# Patient Record
Sex: FEMALE | Race: WHITE | NOT HISPANIC OR LATINO | Employment: FULL TIME | URBAN - NONMETROPOLITAN AREA
[De-identification: names, ages, dates, MRNs, and addresses within clinical notes are randomized per-mention and may not be internally consistent; named-entity substitution may affect disease eponyms.]

---

## 2019-08-20 ENCOUNTER — OFFICE VISIT (OUTPATIENT)
Dept: ORTHOPEDIC SURGERY | Facility: CLINIC | Age: 59
End: 2019-08-20

## 2019-08-20 ENCOUNTER — HOSPITAL ENCOUNTER (OUTPATIENT)
Dept: GENERAL RADIOLOGY | Facility: HOSPITAL | Age: 59
Discharge: HOME | End: 2019-08-20
Admitting: ORTHOPAEDIC SURGERY

## 2019-08-20 VITALS
DIASTOLIC BLOOD PRESSURE: 94 MMHG | HEIGHT: 66 IN | WEIGHT: 135 LBS | HEART RATE: 74 BPM | SYSTOLIC BLOOD PRESSURE: 141 MMHG | BODY MASS INDEX: 21.69 KG/M2

## 2019-08-20 DIAGNOSIS — M25.521 RIGHT ELBOW PAIN: Primary | ICD-10-CM

## 2019-08-20 DIAGNOSIS — S52.134A CLOSED NONDISPLACED FRACTURE OF NECK OF RIGHT RADIUS, INITIAL ENCOUNTER: Primary | ICD-10-CM

## 2019-08-20 DIAGNOSIS — M25.521 RIGHT ELBOW PAIN: ICD-10-CM

## 2019-08-20 PROCEDURE — 73080 X-RAY EXAM OF ELBOW: CPT | Performed by: RADIOLOGY

## 2019-08-20 PROCEDURE — 24650 CLTX RDL HEAD/NCK FX WO MNPJ: CPT | Performed by: ORTHOPAEDIC SURGERY

## 2019-08-20 PROCEDURE — 73080 X-RAY EXAM OF ELBOW: CPT

## 2019-08-20 RX ORDER — LEVOTHYROXINE SODIUM 88 UG/1
88 TABLET ORAL DAILY
COMMUNITY
Start: 2019-08-12 | End: 2021-01-27 | Stop reason: DRUGHIGH

## 2019-08-20 RX ORDER — IBUPROFEN 800 MG/1
800 TABLET ORAL EVERY 8 HOURS PRN
Qty: 30 TABLET | Refills: 1 | Status: SHIPPED | OUTPATIENT
Start: 2019-08-20

## 2019-08-20 NOTE — PROGRESS NOTES
"Patient: Hina Marcum    YOB: 1960    Chief Complaint   Patient presents with   • Right Elbow - Pain, Edema         History of Present Illness: 58-year-old white female who presents for evaluation of her right elbow.  Apparently she actually fell down the stairs couple days ago landing on her arm.  Complains of pain around her elbow especially with movement.  No specific complaints paresthesias    Past Medical History:   Diagnosis Date   • Thyroid disease         Social History     Socioeconomic History   • Marital status:      Spouse name: Not on file   • Number of children: Not on file   • Years of education: Not on file   • Highest education level: Not on file   Tobacco Use   • Smoking status: Former Smoker     Types: Cigarettes     Last attempt to quit: 1996     Years since quittin.0   • Smokeless tobacco: Never Used   Substance and Sexual Activity   • Alcohol use: Yes     Comment: occasionally    • Drug use: No   • Sexual activity: Yes     Partners: Male           Physical Exam: 58 y.o. female  General Appearance:    Alert and oriented x 3, cooperative, in no acute distress                   Vitals:    19 1051   BP: 141/94   Pulse: 74   Weight: 61.2 kg (135 lb)   Height: 167.6 cm (66\")          Right hand grossly neurovascular intact without swelling.  There is pain with any motion of the elbow pronation supination.  The skin is intact no obvious deformity      Radiology:       X-rays taken today and reviewed myself show that she has a nondisplaced short oblique radial neck fracture.      Assessment/Plan: Right elbow radial neck fracture.  Patient was placed in a well molded synthetic long-arm cast.  We will see her back in approximately 3 to 4 weeks for follow-up x-ray and check.  She will be off work for period of time.  Elevate and ice as necessary.  Did give her a prescription for ibuprofen 800 mg every 8 hours.  So note for off work                  Patient's " Body mass index is 21.79 kg/m². BMI is within normal parameters. No follow-up required..      Discussion/Summary:                This chart was completed utilizing the dragon speech recognition software.  Grammatical errors, random word insertions, pronoun errors, and incomplete sentences or occasional consequences of the system due to software limitations, ambient noise, and hardware issues.  Any questions or concerns about the content, text, or information contained within the body of this dictation should be directly addressed to the physician for clarification        This document was signed by Jhony Lei M.D. August 20, 2019 11:05 AM

## 2019-09-10 ENCOUNTER — HOSPITAL ENCOUNTER (OUTPATIENT)
Dept: GENERAL RADIOLOGY | Facility: HOSPITAL | Age: 59
Discharge: HOME | End: 2019-09-10
Admitting: ORTHOPAEDIC SURGERY

## 2019-09-10 ENCOUNTER — OFFICE VISIT (OUTPATIENT)
Dept: ORTHOPEDIC SURGERY | Facility: CLINIC | Age: 59
End: 2019-09-10

## 2019-09-10 VITALS — HEIGHT: 66 IN | WEIGHT: 134.92 LBS | BODY MASS INDEX: 21.68 KG/M2

## 2019-09-10 DIAGNOSIS — S52.134A CLOSED NONDISPLACED FRACTURE OF NECK OF RIGHT RADIUS, INITIAL ENCOUNTER: ICD-10-CM

## 2019-09-10 DIAGNOSIS — S52.134A CLOSED NONDISPLACED FRACTURE OF NECK OF RIGHT RADIUS, INITIAL ENCOUNTER: Primary | ICD-10-CM

## 2019-09-10 PROCEDURE — 99024 POSTOP FOLLOW-UP VISIT: CPT | Performed by: ORTHOPAEDIC SURGERY

## 2019-09-10 PROCEDURE — 73080 X-RAY EXAM OF ELBOW: CPT | Performed by: RADIOLOGY

## 2019-09-10 PROCEDURE — 73080 X-RAY EXAM OF ELBOW: CPT

## 2019-09-10 NOTE — PROGRESS NOTES
"Patient: Hina Marcum    YOB: 1960    Chief Complaint   Patient presents with   • Right Elbow - Follow-up, Fracture         History of Present Illness: Presents today for x-rays out of cast.  Slightly impacted nondisplaced radial head fracture.  We have placed her in a long-arm cast for 3 weeks.  She presents today without major complaints.  States it feels better getting out of the cast feels a lot better than prior to casting.  She complains of stiffness and feels like her wrist is jammed.  No specific paresthesias    Past Medical History:   Diagnosis Date   • Thyroid disease         Social History     Socioeconomic History   • Marital status:      Spouse name: Not on file   • Number of children: Not on file   • Years of education: Not on file   • Highest education level: Not on file   Tobacco Use   • Smoking status: Former Smoker     Types: Cigarettes     Last attempt to quit: 1996     Years since quittin.0   • Smokeless tobacco: Never Used   Substance and Sexual Activity   • Alcohol use: Yes     Comment: occasionally    • Drug use: No   • Sexual activity: Yes     Partners: Male           Physical Exam: 58 y.o. female  General Appearance:    Alert and oriented x 3, cooperative, in no acute distress                   Vitals:    09/10/19 1041   Weight: 61.2 kg (134 lb 14.7 oz)   Height: 167.6 cm (65.98\")          Is intact.  There is no obvious deformity or swelling.  She is lacking maybe about 20 degrees of extension and 20 degrees of flexion but she actually moving her elbow pretty well she is lacking maybe about 20 degrees of pronation and supination but she actually can reasonably pronate and supinate her arm well.  Right hand grossly neurovascular intact without swelling      Radiology:       X-ray shows no change in alignment of the fracture.  Reviewed myself      Assessment/Plan: Healing right radial head fracture.  Plan at this time is to put her in a sling.  She can " begin gentle range of motion as comfort permits of her right elbow.  She can use it for activities of daily living such as brushing her teeth eating doing her hair.  I do not want her to lift anything heavier than a cup of coffee however.  Do not follow this for no use for heavy or repetitive work.  We will see her back in 4 weeks for follow-up x-ray and check            Discussion/Summary:                This chart was completed utilizing the dragon speech recognition software.  Grammatical errors, random word insertions, pronoun errors, and incomplete sentences or occasional consequences of the system due to software limitations, ambient noise, and hardware issues.  Any questions or concerns about the content, text, or information contained within the body of this dictation should be directly addressed to the physician for clarification        This document was signed by Jhony Lei M.D. September 10, 2019 11:51 AM

## 2019-10-08 ENCOUNTER — HOSPITAL ENCOUNTER (OUTPATIENT)
Dept: GENERAL RADIOLOGY | Facility: HOSPITAL | Age: 59
Discharge: HOME | End: 2019-10-08
Admitting: ORTHOPAEDIC SURGERY

## 2019-10-08 ENCOUNTER — OFFICE VISIT (OUTPATIENT)
Dept: ORTHOPEDIC SURGERY | Facility: CLINIC | Age: 59
End: 2019-10-08

## 2019-10-08 VITALS — HEIGHT: 66 IN | WEIGHT: 134 LBS | BODY MASS INDEX: 21.53 KG/M2

## 2019-10-08 DIAGNOSIS — S52.134A CLOSED NONDISPLACED FRACTURE OF NECK OF RIGHT RADIUS, INITIAL ENCOUNTER: Primary | ICD-10-CM

## 2019-10-08 DIAGNOSIS — S52.134D CLOSED NONDISPLACED FRACTURE OF NECK OF RIGHT RADIUS WITH ROUTINE HEALING, SUBSEQUENT ENCOUNTER: Primary | ICD-10-CM

## 2019-10-08 DIAGNOSIS — S52.134A CLOSED NONDISPLACED FRACTURE OF NECK OF RIGHT RADIUS, INITIAL ENCOUNTER: ICD-10-CM

## 2019-10-08 PROCEDURE — 73080 X-RAY EXAM OF ELBOW: CPT | Performed by: RADIOLOGY

## 2019-10-08 PROCEDURE — 99024 POSTOP FOLLOW-UP VISIT: CPT | Performed by: ORTHOPAEDIC SURGERY

## 2019-10-08 PROCEDURE — 73080 X-RAY EXAM OF ELBOW: CPT

## 2019-10-08 NOTE — PROGRESS NOTES
"Patient: Hina Marcum    YOB: 1960    Chief Complaint   Patient presents with   • Right Elbow - Follow-up, Fracture         History of Present Illness: Patient presents for follow-up of her right radial head fracture.  She is doing very well without any significant complaints.  No paresthesias looking to get back to work    Past Medical History:   Diagnosis Date   • Thyroid disease         Social History     Socioeconomic History   • Marital status:      Spouse name: Not on file   • Number of children: Not on file   • Years of education: Not on file   • Highest education level: Not on file   Tobacco Use   • Smoking status: Former Smoker     Types: Cigarettes     Last attempt to quit: 1996     Years since quittin.1   • Smokeless tobacco: Never Used   Substance and Sexual Activity   • Alcohol use: Yes     Comment: occasionally    • Drug use: No   • Sexual activity: Yes     Partners: Male           Physical Exam: 58 y.o. female  General Appearance:    Alert and oriented x 3, cooperative, in no acute distress                   Vitals:    10/08/19 1458   Weight: 60.8 kg (134 lb)   Height: 167.6 cm (66\")          Exam shows she has almost full extension may be lacking couple degrees has good flexion full pronation supination      Radiology:       X-ray shows no significant change in alignment of the fracture with callus noted these were reviewed by myself      Assessment/Plan: Healing radial head fracture.  Plan is to go back to work next Monday.  Be careful of falling on this or any extreme activity with this.  Otherwise pretty much activities of daily living as tolerates.  We will see her back in 4 weeks for final x-ray and check                      Discussion/Summary:                This chart was completed utilizing the dragon speech recognition software.  Grammatical errors, random word insertions, pronoun errors, and incomplete sentences or occasional consequences of the system " due to software limitations, ambient noise, and hardware issues.  Any questions or concerns about the content, text, or information contained within the body of this dictation should be directly addressed to the physician for clarification        This document was signed by Jhony eLi M.D. October 8, 2019 3:45 PM

## 2019-11-08 ENCOUNTER — APPOINTMENT (OUTPATIENT)
Dept: GENERAL RADIOLOGY | Facility: HOSPITAL | Age: 59
End: 2019-11-08

## 2020-01-30 ENCOUNTER — TRANSCRIBE ORDERS (OUTPATIENT)
Dept: ADMINISTRATIVE | Facility: HOSPITAL | Age: 60
End: 2020-01-30

## 2020-01-30 DIAGNOSIS — R07.89 CHEST DISCOMFORT: Primary | ICD-10-CM

## 2020-02-03 ENCOUNTER — HOSPITAL ENCOUNTER (OUTPATIENT)
Dept: CARDIOLOGY | Facility: HOSPITAL | Age: 60
Discharge: HOME | End: 2020-02-03
Admitting: NURSE PRACTITIONER

## 2020-02-03 DIAGNOSIS — R07.89 CHEST DISCOMFORT: ICD-10-CM

## 2020-02-03 PROCEDURE — 93017 CV STRESS TEST TRACING ONLY: CPT

## 2020-02-03 PROCEDURE — 93018 CV STRESS TEST I&R ONLY: CPT | Performed by: INTERNAL MEDICINE

## 2020-02-03 RX ORDER — OMEPRAZOLE 20 MG/1
20 CAPSULE, DELAYED RELEASE ORAL DAILY
COMMUNITY

## 2020-02-04 LAB
BH CV STRESS BP STAGE 1: NORMAL
BH CV STRESS BP STAGE 2: NORMAL
BH CV STRESS BP STAGE 3: NORMAL
BH CV STRESS BP STAGE 4: NORMAL
BH CV STRESS DURATION MIN STAGE 1: 3
BH CV STRESS DURATION MIN STAGE 2: 3
BH CV STRESS DURATION MIN STAGE 3: 3
BH CV STRESS DURATION MIN STAGE 4: 3
BH CV STRESS DURATION SEC STAGE 1: 0
BH CV STRESS DURATION SEC STAGE 2: 0
BH CV STRESS DURATION SEC STAGE 3: 0
BH CV STRESS DURATION SEC STAGE 4: 0
BH CV STRESS GRADE STAGE 1: 10
BH CV STRESS GRADE STAGE 2: 12
BH CV STRESS GRADE STAGE 3: 14
BH CV STRESS GRADE STAGE 4: 16
BH CV STRESS HR STAGE 1: 101
BH CV STRESS HR STAGE 2: 109
BH CV STRESS HR STAGE 3: 135
BH CV STRESS HR STAGE 4: 136
BH CV STRESS METS STAGE 1: 5
BH CV STRESS METS STAGE 2: 7.5
BH CV STRESS METS STAGE 3: 10
BH CV STRESS METS STAGE 4: 13.5
BH CV STRESS PROTOCOL 1: NORMAL
BH CV STRESS RECOVERY BP: NORMAL MMHG
BH CV STRESS RECOVERY HR: 79 BPM
BH CV STRESS SPEED STAGE 1: 1.7
BH CV STRESS SPEED STAGE 2: 2.5
BH CV STRESS SPEED STAGE 3: 3.4
BH CV STRESS SPEED STAGE 4: 4.2
BH CV STRESS STAGE 1: 1
BH CV STRESS STAGE 2: 2
BH CV STRESS STAGE 3: 3
BH CV STRESS STAGE 4: 4
MAXIMAL PREDICTED HEART RATE: 160 BPM
PERCENT MAX PREDICTED HR: 85 %
STRESS BASELINE BP: NORMAL MMHG
STRESS BASELINE HR: 78 BPM
STRESS PERCENT HR: 100 %
STRESS POST ESTIMATED WORKLOAD: 10.1 METS
STRESS POST EXERCISE DUR MIN: 9 MIN
STRESS POST EXERCISE DUR SEC: 15 SEC
STRESS POST PEAK BP: NORMAL MMHG
STRESS POST PEAK HR: 136 BPM
STRESS TARGET HR: 136 BPM

## 2020-02-11 ENCOUNTER — OFFICE VISIT (OUTPATIENT)
Dept: CARDIOLOGY | Facility: CLINIC | Age: 60
End: 2020-02-11

## 2020-02-11 VITALS
HEART RATE: 64 BPM | BODY MASS INDEX: 27.71 KG/M2 | OXYGEN SATURATION: 98 % | DIASTOLIC BLOOD PRESSURE: 77 MMHG | WEIGHT: 172.4 LBS | SYSTOLIC BLOOD PRESSURE: 145 MMHG | HEIGHT: 66 IN

## 2020-02-11 DIAGNOSIS — R94.39 ABNORMAL STRESS TEST: Primary | ICD-10-CM

## 2020-02-11 DIAGNOSIS — R07.89 CHEST DISCOMFORT: ICD-10-CM

## 2020-02-11 DIAGNOSIS — K21.9 GASTROESOPHAGEAL REFLUX DISEASE WITHOUT ESOPHAGITIS: ICD-10-CM

## 2020-02-11 PROCEDURE — 99214 OFFICE O/P EST MOD 30 MIN: CPT | Performed by: INTERNAL MEDICINE

## 2020-02-11 PROCEDURE — 93000 ELECTROCARDIOGRAM COMPLETE: CPT | Performed by: INTERNAL MEDICINE

## 2020-02-11 RX ORDER — OMEPRAZOLE 20 MG/1
1 CAPSULE, DELAYED RELEASE ORAL
COMMUNITY
Start: 2020-01-28 | End: 2020-05-19

## 2020-02-11 RX ORDER — FLUOXETINE HYDROCHLORIDE 20 MG/1
20 CAPSULE ORAL DAILY
COMMUNITY
Start: 2020-02-07 | End: 2021-01-27

## 2020-02-27 ENCOUNTER — HOSPITAL ENCOUNTER (OUTPATIENT)
Dept: CARDIOLOGY | Facility: HOSPITAL | Age: 60
Discharge: HOME | End: 2020-02-27

## 2020-02-27 DIAGNOSIS — R94.39 ABNORMAL STRESS TEST: ICD-10-CM

## 2020-02-27 DIAGNOSIS — R07.89 CHEST DISCOMFORT: ICD-10-CM

## 2020-02-27 DIAGNOSIS — K21.9 GASTROESOPHAGEAL REFLUX DISEASE WITHOUT ESOPHAGITIS: ICD-10-CM

## 2020-02-27 PROCEDURE — 93351 STRESS TTE COMPLETE: CPT

## 2020-02-27 PROCEDURE — 93350 STRESS TTE ONLY: CPT | Performed by: INTERNAL MEDICINE

## 2020-02-27 PROCEDURE — 93320 DOPPLER ECHO COMPLETE: CPT | Performed by: INTERNAL MEDICINE

## 2020-02-27 PROCEDURE — 93325 DOPPLER ECHO COLOR FLOW MAPG: CPT

## 2020-02-27 PROCEDURE — 93325 DOPPLER ECHO COLOR FLOW MAPG: CPT | Performed by: INTERNAL MEDICINE

## 2020-02-27 PROCEDURE — 93018 CV STRESS TEST I&R ONLY: CPT | Performed by: INTERNAL MEDICINE

## 2020-02-27 PROCEDURE — 93320 DOPPLER ECHO COMPLETE: CPT

## 2020-03-01 LAB
BH CV ECHO MEAS - % IVS THICK: 1.2 %
BH CV ECHO MEAS - % LVPW THICK: 11.5 %
BH CV ECHO MEAS - ACS: 2.2 CM
BH CV ECHO MEAS - AO MAX PG: 5.4 MMHG
BH CV ECHO MEAS - AO MEAN PG: 3 MMHG
BH CV ECHO MEAS - AO ROOT AREA (BSA CORRECTED): 1.8
BH CV ECHO MEAS - AO ROOT AREA: 9.3 CM^2
BH CV ECHO MEAS - AO ROOT DIAM: 3.5 CM
BH CV ECHO MEAS - AO V2 MAX: 116 CM/SEC
BH CV ECHO MEAS - AO V2 MEAN: 72.9 CM/SEC
BH CV ECHO MEAS - AO V2 VTI: 23.2 CM
BH CV ECHO MEAS - BSA(HAYCOCK): 1.9 M^2
BH CV ECHO MEAS - BSA: 1.9 M^2
BH CV ECHO MEAS - BZI_BMI: 27.8 KILOGRAMS/M^2
BH CV ECHO MEAS - BZI_METRIC_HEIGHT: 167.6 CM
BH CV ECHO MEAS - BZI_METRIC_WEIGHT: 78 KG
BH CV ECHO MEAS - EDV(CUBED): 74.6 ML
BH CV ECHO MEAS - EDV(MOD-SP4): 49.6 ML
BH CV ECHO MEAS - EDV(TEICH): 79 ML
BH CV ECHO MEAS - EF(CUBED): 67.3 %
BH CV ECHO MEAS - EF(MOD-SP4): 66.5 %
BH CV ECHO MEAS - EF(TEICH): 59.2 %
BH CV ECHO MEAS - ESV(CUBED): 24.4 ML
BH CV ECHO MEAS - ESV(MOD-SP4): 16.6 ML
BH CV ECHO MEAS - ESV(TEICH): 32.2 ML
BH CV ECHO MEAS - FS: 31.1 %
BH CV ECHO MEAS - IVS/LVPW: 1.1
BH CV ECHO MEAS - IVSD: 1.2 CM
BH CV ECHO MEAS - IVSS: 1.2 CM
BH CV ECHO MEAS - LA DIMENSION: 3.3 CM
BH CV ECHO MEAS - LA/AO: 0.94
BH CV ECHO MEAS - LV DIASTOLIC VOL/BSA (35-75): 26.4 ML/M^2
BH CV ECHO MEAS - LV MASS(C)D: 166.2 GRAMS
BH CV ECHO MEAS - LV MASS(C)DI: 88.6 GRAMS/M^2
BH CV ECHO MEAS - LV MASS(C)S: 105.6 GRAMS
BH CV ECHO MEAS - LV MASS(C)SI: 56.3 GRAMS/M^2
BH CV ECHO MEAS - LV SYSTOLIC VOL/BSA (12-30): 8.8 ML/M^2
BH CV ECHO MEAS - LVIDD: 4.2 CM
BH CV ECHO MEAS - LVIDS: 2.9 CM
BH CV ECHO MEAS - LVLD AP4: 6.6 CM
BH CV ECHO MEAS - LVLS AP4: 5.7 CM
BH CV ECHO MEAS - LVOT AREA (M): 3.5 CM^2
BH CV ECHO MEAS - LVOT AREA: 3.5 CM^2
BH CV ECHO MEAS - LVOT DIAM: 2.1 CM
BH CV ECHO MEAS - LVPWD: 1.1 CM
BH CV ECHO MEAS - LVPWS: 1.2 CM
BH CV ECHO MEAS - MV A MAX VEL: 69.4 CM/SEC
BH CV ECHO MEAS - MV E MAX VEL: 87.4 CM/SEC
BH CV ECHO MEAS - MV E/A: 1.3
BH CV ECHO MEAS - PA ACC TIME: 0.11 SEC
BH CV ECHO MEAS - PA PR(ACCEL): 31.3 MMHG
BH CV ECHO MEAS - RAP SYSTOLE: 10 MMHG
BH CV ECHO MEAS - RVSP: 33.8 MMHG
BH CV ECHO MEAS - SI(AO): 115.6 ML/M^2
BH CV ECHO MEAS - SI(CUBED): 26.8 ML/M^2
BH CV ECHO MEAS - SI(MOD-SP4): 17.6 ML/M^2
BH CV ECHO MEAS - SI(TEICH): 25 ML/M^2
BH CV ECHO MEAS - SV(AO): 216.9 ML
BH CV ECHO MEAS - SV(CUBED): 50.2 ML
BH CV ECHO MEAS - SV(MOD-SP4): 33 ML
BH CV ECHO MEAS - SV(TEICH): 46.8 ML
BH CV ECHO MEAS - TR MAX VEL: 244 CM/SEC
BH CV STRESS BP STAGE 1: NORMAL
BH CV STRESS BP STAGE 2: NORMAL
BH CV STRESS BP STAGE 3: NORMAL
BH CV STRESS BP STAGE 4: NORMAL
BH CV STRESS DURATION MIN STAGE 1: 3
BH CV STRESS DURATION MIN STAGE 2: 3
BH CV STRESS DURATION MIN STAGE 3: 3
BH CV STRESS DURATION MIN STAGE 4: 3
BH CV STRESS DURATION SEC STAGE 1: 0
BH CV STRESS DURATION SEC STAGE 2: 0
BH CV STRESS DURATION SEC STAGE 3: 0
BH CV STRESS DURATION SEC STAGE 4: 0
BH CV STRESS ECHO POST STRESS EJECTION FRACTION EF: 80 %
BH CV STRESS GRADE STAGE 1: 10
BH CV STRESS GRADE STAGE 2: 12
BH CV STRESS GRADE STAGE 3: 14
BH CV STRESS GRADE STAGE 4: 16
BH CV STRESS HR STAGE 1: 93
BH CV STRESS HR STAGE 2: 106
BH CV STRESS HR STAGE 3: 130
BH CV STRESS HR STAGE 4: 136
BH CV STRESS METS STAGE 1: 5
BH CV STRESS METS STAGE 2: 7.5
BH CV STRESS METS STAGE 3: 10
BH CV STRESS METS STAGE 4: 13.5
BH CV STRESS PROTOCOL 1: NORMAL
BH CV STRESS RECOVERY BP: NORMAL MMHG
BH CV STRESS RECOVERY HR: 73 BPM
BH CV STRESS SPEED STAGE 1: 1.7
BH CV STRESS SPEED STAGE 2: 2.5
BH CV STRESS SPEED STAGE 3: 3.4
BH CV STRESS SPEED STAGE 4: 4.2
BH CV STRESS STAGE 1: 1
BH CV STRESS STAGE 2: 2
BH CV STRESS STAGE 3: 3
BH CV STRESS STAGE 4: 4
MAXIMAL PREDICTED HEART RATE: 160 BPM
PERCENT MAX PREDICTED HR: 85 %
STRESS BASELINE BP: NORMAL MMHG
STRESS BASELINE HR: 58 BPM
STRESS PERCENT HR: 100 %
STRESS POST ESTIMATED WORKLOAD: 10.1 METS
STRESS POST EXERCISE DUR MIN: 9 MIN
STRESS POST EXERCISE DUR SEC: 20 SEC
STRESS POST PEAK BP: NORMAL MMHG
STRESS POST PEAK HR: 136 BPM
STRESS TARGET HR: 136 BPM

## 2020-03-03 ENCOUNTER — TELEPHONE (OUTPATIENT)
Dept: CARDIOLOGY | Facility: CLINIC | Age: 60
End: 2020-03-03

## 2020-03-03 NOTE — TELEPHONE ENCOUNTER
----- Message from Jean-Pierre Ricketts MD sent at 3/1/2020  3:27 PM EST -----  Please call patient to her stress echo was normal, she has some changes in ekg on treadmill test but likely false positive and I will explain more in detail  Thanks.

## 2020-03-03 NOTE — TELEPHONE ENCOUNTER
Called Hina to let her know that per Dr. Crowell, her stress echo was normal. She did show some changes in her EKG on the treadmill test but was likely false positive and Dr. Crowell would explain that at her follow up in May. She is aware and understands.

## 2020-03-11 ENCOUNTER — PREP FOR SURGERY (OUTPATIENT)
Dept: OTHER | Facility: HOSPITAL | Age: 60
End: 2020-03-11

## 2020-03-11 DIAGNOSIS — M20.41 HAMMERTOE OF RIGHT FOOT: Primary | ICD-10-CM

## 2020-03-11 RX ORDER — CEFAZOLIN SODIUM 2 G/50ML
2 SOLUTION INTRAVENOUS ONCE
Status: CANCELLED | OUTPATIENT
Start: 2020-03-17 | End: 2020-03-11

## 2020-03-13 ENCOUNTER — APPOINTMENT (OUTPATIENT)
Dept: PREADMISSION TESTING | Facility: HOSPITAL | Age: 60
End: 2020-03-13

## 2020-03-13 LAB
ANION GAP SERPL CALCULATED.3IONS-SCNC: 13.2 MMOL/L (ref 5–15)
BUN BLD-MCNC: 21 MG/DL (ref 8–23)
BUN/CREAT SERPL: 28.4 (ref 7–25)
CALCIUM SPEC-SCNC: 9.3 MG/DL (ref 8.6–10.5)
CHLORIDE SERPL-SCNC: 101 MMOL/L (ref 98–107)
CO2 SERPL-SCNC: 24.8 MMOL/L (ref 22–29)
CREAT BLD-MCNC: 0.74 MG/DL (ref 0.57–1)
DEPRECATED RDW RBC AUTO: 42.3 FL (ref 37–54)
ERYTHROCYTE [DISTWIDTH] IN BLOOD BY AUTOMATED COUNT: 12.7 % (ref 12.3–15.4)
GFR SERPL CREATININE-BSD FRML MDRD: 80 ML/MIN/1.73
GLUCOSE BLD-MCNC: 94 MG/DL (ref 65–99)
HCT VFR BLD AUTO: 41.6 % (ref 34–46.6)
HGB BLD-MCNC: 13.4 G/DL (ref 12–15.9)
MCH RBC QN AUTO: 29.5 PG (ref 26.6–33)
MCHC RBC AUTO-ENTMCNC: 32.2 G/DL (ref 31.5–35.7)
MCV RBC AUTO: 91.4 FL (ref 79–97)
PLATELET # BLD AUTO: 337 10*3/MM3 (ref 140–450)
PMV BLD AUTO: 9.4 FL (ref 6–12)
POTASSIUM BLD-SCNC: 3.8 MMOL/L (ref 3.5–5.2)
RBC # BLD AUTO: 4.55 10*6/MM3 (ref 3.77–5.28)
SODIUM BLD-SCNC: 139 MMOL/L (ref 136–145)
WBC NRBC COR # BLD: 7.95 10*3/MM3 (ref 3.4–10.8)

## 2020-03-13 PROCEDURE — 36415 COLL VENOUS BLD VENIPUNCTURE: CPT

## 2020-03-13 PROCEDURE — 80048 BASIC METABOLIC PNL TOTAL CA: CPT | Performed by: ANESTHESIOLOGY

## 2020-03-13 PROCEDURE — 85027 COMPLETE CBC AUTOMATED: CPT | Performed by: ANESTHESIOLOGY

## 2020-03-13 NOTE — DISCHARGE INSTRUCTIONS

## 2020-03-17 ENCOUNTER — ANESTHESIA (OUTPATIENT)
Dept: PERIOP | Facility: HOSPITAL | Age: 60
End: 2020-03-17

## 2020-03-17 ENCOUNTER — ANESTHESIA EVENT (OUTPATIENT)
Dept: PERIOP | Facility: HOSPITAL | Age: 60
End: 2020-03-17

## 2020-03-17 ENCOUNTER — APPOINTMENT (OUTPATIENT)
Dept: GENERAL RADIOLOGY | Facility: HOSPITAL | Age: 60
End: 2020-03-17

## 2020-03-17 ENCOUNTER — HOSPITAL ENCOUNTER (OUTPATIENT)
Facility: HOSPITAL | Age: 60
Setting detail: HOSPITAL OUTPATIENT SURGERY
Discharge: HOME | End: 2020-03-17
Attending: PODIATRIST | Admitting: PODIATRIST

## 2020-03-17 VITALS
OXYGEN SATURATION: 97 % | TEMPERATURE: 97.6 F | HEART RATE: 88 BPM | DIASTOLIC BLOOD PRESSURE: 90 MMHG | HEIGHT: 64 IN | RESPIRATION RATE: 16 BRPM | WEIGHT: 171.5 LBS | SYSTOLIC BLOOD PRESSURE: 156 MMHG | BODY MASS INDEX: 29.28 KG/M2

## 2020-03-17 DIAGNOSIS — M20.41 HAMMERTOE OF RIGHT FOOT: ICD-10-CM

## 2020-03-17 PROCEDURE — 76000 FLUOROSCOPY <1 HR PHYS/QHP: CPT

## 2020-03-17 PROCEDURE — 25010000002 FENTANYL CITRATE (PF) 100 MCG/2ML SOLUTION: Performed by: NURSE ANESTHETIST, CERTIFIED REGISTERED

## 2020-03-17 PROCEDURE — C1713 ANCHOR/SCREW BN/BN,TIS/BN: HCPCS | Performed by: PODIATRIST

## 2020-03-17 PROCEDURE — 25010000002 ONDANSETRON PER 1 MG: Performed by: NURSE ANESTHETIST, CERTIFIED REGISTERED

## 2020-03-17 PROCEDURE — 25010000003 LIDOCAINE 1 % SOLUTION: Performed by: PODIATRIST

## 2020-03-17 PROCEDURE — 25010000003 CEFAZOLIN SODIUM-DEXTROSE 2-3 GM-%(50ML) RECONSTITUTED SOLUTION: Performed by: PODIATRIST

## 2020-03-17 PROCEDURE — 76000 FLUOROSCOPY <1 HR PHYS/QHP: CPT | Performed by: RADIOLOGY

## 2020-03-17 PROCEDURE — 25010000002 PROPOFOL 10 MG/ML EMULSION: Performed by: NURSE ANESTHETIST, CERTIFIED REGISTERED

## 2020-03-17 PROCEDURE — 25010000002 MIDAZOLAM PER 1 MG: Performed by: NURSE ANESTHETIST, CERTIFIED REGISTERED

## 2020-03-17 DEVICE — KWIRE SMOTH DBL/TROC .045X4IN: Type: IMPLANTABLE DEVICE | Site: TOE SECOND | Status: FUNCTIONAL

## 2020-03-17 RX ORDER — SODIUM CHLORIDE 0.9 % (FLUSH) 0.9 %
10 SYRINGE (ML) INJECTION EVERY 12 HOURS SCHEDULED
Status: DISCONTINUED | OUTPATIENT
Start: 2020-03-17 | End: 2020-03-17 | Stop reason: HOSPADM

## 2020-03-17 RX ORDER — LIDOCAINE HYDROCHLORIDE 20 MG/ML
INJECTION, SOLUTION INFILTRATION; PERINEURAL AS NEEDED
Status: DISCONTINUED | OUTPATIENT
Start: 2020-03-17 | End: 2020-03-17 | Stop reason: SURG

## 2020-03-17 RX ORDER — NALOXONE HCL 0.4 MG/ML
0.1 VIAL (ML) INJECTION
Status: CANCELLED | OUTPATIENT
Start: 2020-03-17 | End: 2020-03-18

## 2020-03-17 RX ORDER — ONDANSETRON 2 MG/ML
INJECTION INTRAMUSCULAR; INTRAVENOUS AS NEEDED
Status: DISCONTINUED | OUTPATIENT
Start: 2020-03-17 | End: 2020-03-17 | Stop reason: SURG

## 2020-03-17 RX ORDER — FAMOTIDINE 10 MG/ML
INJECTION, SOLUTION INTRAVENOUS AS NEEDED
Status: DISCONTINUED | OUTPATIENT
Start: 2020-03-17 | End: 2020-03-17 | Stop reason: SURG

## 2020-03-17 RX ORDER — DIPHENHYDRAMINE HCL 25 MG
25 CAPSULE ORAL EVERY 4 HOURS PRN
Status: CANCELLED | OUTPATIENT
Start: 2020-03-17

## 2020-03-17 RX ORDER — BUPIVACAINE HYDROCHLORIDE 5 MG/ML
INJECTION, SOLUTION PERINEURAL AS NEEDED
Status: DISCONTINUED | OUTPATIENT
Start: 2020-03-17 | End: 2020-03-17 | Stop reason: HOSPADM

## 2020-03-17 RX ORDER — OXYCODONE HYDROCHLORIDE AND ACETAMINOPHEN 5; 325 MG/1; MG/1
1 TABLET ORAL ONCE AS NEEDED
Status: DISCONTINUED | OUTPATIENT
Start: 2020-03-17 | End: 2020-03-17 | Stop reason: HOSPADM

## 2020-03-17 RX ORDER — DIPHENHYDRAMINE HYDROCHLORIDE 50 MG/ML
25 INJECTION INTRAMUSCULAR; INTRAVENOUS EVERY 4 HOURS PRN
Status: CANCELLED | OUTPATIENT
Start: 2020-03-17

## 2020-03-17 RX ORDER — IPRATROPIUM BROMIDE AND ALBUTEROL SULFATE 2.5; .5 MG/3ML; MG/3ML
3 SOLUTION RESPIRATORY (INHALATION) ONCE AS NEEDED
Status: DISCONTINUED | OUTPATIENT
Start: 2020-03-17 | End: 2020-03-17 | Stop reason: HOSPADM

## 2020-03-17 RX ORDER — ONDANSETRON 2 MG/ML
4 INJECTION INTRAMUSCULAR; INTRAVENOUS AS NEEDED
Status: DISCONTINUED | OUTPATIENT
Start: 2020-03-17 | End: 2020-03-17 | Stop reason: HOSPADM

## 2020-03-17 RX ORDER — MAGNESIUM HYDROXIDE 1200 MG/15ML
LIQUID ORAL AS NEEDED
Status: DISCONTINUED | OUTPATIENT
Start: 2020-03-17 | End: 2020-03-17 | Stop reason: HOSPADM

## 2020-03-17 RX ORDER — OXYCODONE HYDROCHLORIDE AND ACETAMINOPHEN 5; 325 MG/1; MG/1
1 TABLET ORAL EVERY 4 HOURS PRN
Qty: 40 TABLET | Refills: 0 | Status: SHIPPED | OUTPATIENT
Start: 2020-03-17 | End: 2020-05-19

## 2020-03-17 RX ORDER — FENTANYL CITRATE 50 UG/ML
INJECTION, SOLUTION INTRAMUSCULAR; INTRAVENOUS AS NEEDED
Status: DISCONTINUED | OUTPATIENT
Start: 2020-03-17 | End: 2020-03-17 | Stop reason: SURG

## 2020-03-17 RX ORDER — PROPOFOL 10 MG/ML
VIAL (ML) INTRAVENOUS AS NEEDED
Status: DISCONTINUED | OUTPATIENT
Start: 2020-03-17 | End: 2020-03-17 | Stop reason: SURG

## 2020-03-17 RX ORDER — MEPERIDINE HYDROCHLORIDE 25 MG/ML
12.5 INJECTION INTRAMUSCULAR; INTRAVENOUS; SUBCUTANEOUS
Status: DISCONTINUED | OUTPATIENT
Start: 2020-03-17 | End: 2020-03-17 | Stop reason: HOSPADM

## 2020-03-17 RX ORDER — MORPHINE SULFATE 2 MG/ML
2 INJECTION, SOLUTION INTRAMUSCULAR; INTRAVENOUS
Status: CANCELLED | OUTPATIENT
Start: 2020-03-17

## 2020-03-17 RX ORDER — FENTANYL CITRATE 50 UG/ML
50 INJECTION, SOLUTION INTRAMUSCULAR; INTRAVENOUS
Status: DISCONTINUED | OUTPATIENT
Start: 2020-03-17 | End: 2020-03-17 | Stop reason: HOSPADM

## 2020-03-17 RX ORDER — CEFAZOLIN SODIUM 2 G/50ML
2 SOLUTION INTRAVENOUS ONCE
Status: COMPLETED | OUTPATIENT
Start: 2020-03-17 | End: 2020-03-17

## 2020-03-17 RX ORDER — MIDAZOLAM HYDROCHLORIDE 1 MG/ML
INJECTION INTRAMUSCULAR; INTRAVENOUS AS NEEDED
Status: DISCONTINUED | OUTPATIENT
Start: 2020-03-17 | End: 2020-03-17 | Stop reason: SURG

## 2020-03-17 RX ORDER — ONDANSETRON 2 MG/ML
4 INJECTION INTRAMUSCULAR; INTRAVENOUS ONCE AS NEEDED
Status: CANCELLED | OUTPATIENT
Start: 2020-03-17

## 2020-03-17 RX ORDER — SODIUM CHLORIDE, SODIUM LACTATE, POTASSIUM CHLORIDE, CALCIUM CHLORIDE 600; 310; 30; 20 MG/100ML; MG/100ML; MG/100ML; MG/100ML
125 INJECTION, SOLUTION INTRAVENOUS CONTINUOUS
Status: DISCONTINUED | OUTPATIENT
Start: 2020-03-17 | End: 2020-03-17 | Stop reason: HOSPADM

## 2020-03-17 RX ORDER — SODIUM CHLORIDE 0.9 % (FLUSH) 0.9 %
10 SYRINGE (ML) INJECTION AS NEEDED
Status: DISCONTINUED | OUTPATIENT
Start: 2020-03-17 | End: 2020-03-17 | Stop reason: HOSPADM

## 2020-03-17 RX ORDER — LIDOCAINE HYDROCHLORIDE 10 MG/ML
INJECTION, SOLUTION INFILTRATION; PERINEURAL AS NEEDED
Status: DISCONTINUED | OUTPATIENT
Start: 2020-03-17 | End: 2020-03-17 | Stop reason: HOSPADM

## 2020-03-17 RX ADMIN — MIDAZOLAM HYDROCHLORIDE 2 MG: 1 INJECTION, SOLUTION INTRAMUSCULAR; INTRAVENOUS at 09:00

## 2020-03-17 RX ADMIN — CEFAZOLIN SODIUM 2 G: 2 SOLUTION INTRAVENOUS at 09:00

## 2020-03-17 RX ADMIN — EPHEDRINE SULFATE 10 MG: 50 INJECTION, SOLUTION INTRAVENOUS at 10:20

## 2020-03-17 RX ADMIN — ONDANSETRON 4 MG: 2 INJECTION INTRAMUSCULAR; INTRAVENOUS at 09:04

## 2020-03-17 RX ADMIN — SODIUM CHLORIDE, POTASSIUM CHLORIDE, SODIUM LACTATE AND CALCIUM CHLORIDE: 600; 310; 30; 20 INJECTION, SOLUTION INTRAVENOUS at 09:56

## 2020-03-17 RX ADMIN — LIDOCAINE HYDROCHLORIDE 60 MG: 20 INJECTION, SOLUTION INFILTRATION; PERINEURAL at 09:04

## 2020-03-17 RX ADMIN — PROPOFOL 150 MG: 10 INJECTION, EMULSION INTRAVENOUS at 09:04

## 2020-03-17 RX ADMIN — FENTANYL CITRATE 50 MCG: 50 INJECTION INTRAMUSCULAR; INTRAVENOUS at 11:15

## 2020-03-17 RX ADMIN — PROPOFOL 50 MG: 10 INJECTION, EMULSION INTRAVENOUS at 10:14

## 2020-03-17 RX ADMIN — FENTANYL CITRATE 100 MCG: 50 INJECTION INTRAMUSCULAR; INTRAVENOUS at 09:00

## 2020-03-17 RX ADMIN — FENTANYL CITRATE 50 MCG: 50 INJECTION INTRAMUSCULAR; INTRAVENOUS at 10:15

## 2020-03-17 RX ADMIN — EPHEDRINE SULFATE 10 MG: 50 INJECTION, SOLUTION INTRAVENOUS at 09:37

## 2020-03-17 RX ADMIN — SODIUM CHLORIDE, POTASSIUM CHLORIDE, SODIUM LACTATE AND CALCIUM CHLORIDE 125 ML/HR: 600; 310; 30; 20 INJECTION, SOLUTION INTRAVENOUS at 08:24

## 2020-03-17 RX ADMIN — FAMOTIDINE 20 MG: 10 INJECTION INTRAVENOUS at 09:04

## 2020-03-17 RX ADMIN — PROPOFOL 50 MG: 10 INJECTION, EMULSION INTRAVENOUS at 10:12

## 2020-03-17 RX ADMIN — FENTANYL CITRATE 50 MCG: 50 INJECTION INTRAMUSCULAR; INTRAVENOUS at 10:12

## 2020-03-17 NOTE — ANESTHESIA PREPROCEDURE EVALUATION
Anesthesia Evaluation     Patient summary reviewed and Nursing notes reviewed   no history of anesthetic complications:  NPO Solid Status: > 8 hours  NPO Liquid Status: > 8 hours           Airway   Mallampati: I  TM distance: >3 FB  Neck ROM: full  No difficulty expected  Dental - normal exam   (+) lower dentures and upper dentures    Pulmonary - negative pulmonary ROS and normal exam    breath sounds clear to auscultation  Cardiovascular - negative cardio ROS and normal exam    ECG reviewed  Rhythm: regular  Rate: normal        Neuro/Psych- negative ROS  GI/Hepatic/Renal/Endo    (+)  GERD,  thyroid problem hypothyroidism    Musculoskeletal     (+) back pain,   Abdominal  - normal exam    Bowel sounds: normal.   Substance History - negative use     OB/GYN negative ob/gyn ROS         Other   arthritis,                      Anesthesia Plan    ASA 2     general   (Ms. Marcum had negative stress echo on 2/2020.)  intravenous induction     Anesthetic plan, all risks, benefits, and alternatives have been provided, discussed and informed consent has been obtained with: patient and spouse/significant other.    Plan discussed with CRNA.

## 2020-03-17 NOTE — OP NOTE
Hina Marcum  3/17/2020      Operative Progress Note:    Surgeon and Assistant: Dr. Marimar Lugo DPM    Pre-Operative Diagnosis: Hammertoes right foot 2, 3, 4, 5    Post-Operative Diagnosis: Hammertoes right foot 2, 3, 4, 5    Procedure(s):    1.  Arthrodesis right 2nd toe PIPJ  2.  Arthrodesis right 3rd toe PIPJ  3.  Arthroplasty right 4th toe PIPJ  4.  Flexor capsulotomy right 5th toe DIPJ    Type of Anesthesia Administered: General with ankle block consisting of 10cc 50:50 mix 1% lidocaine and 0.5% marcaine    Estimated Blood Loss: 2mL    Hemostasis: ankle tourniquet inflated to 250mmHg    Blood Products: None    Specimen Obtained/Removed: None    Complication(s):  None    Graft/Implant/Prosthetics/Implanted Device/Transplants: 0.045 inch kirshner wires x3 with corresponding jergen balls    Indication: 59 year old female with past medical history significant for hypothyroid, GERD. She has chronic pain to right foot toes. She has history of multiple injuries and fractures involving the toes causing deformation, making it painful to wear shoes and ambulate. She presents today for surgical treatment after thorough discussion of all alternatives, risks, and benefits.    Findings: Contracture of right foot toes 2, 3, 4, 5    Operative Report:  She was identified in the preoperative holding area, formal consent was signed, and the right lower extremity was marked as the correct site.  Patient was brought to the operating suite and placed in the operating table in supine position.  Timeout was performed she then underwent anesthesia as dictated above.  A well-padded right ankle tourniquet was applied. The right foot was then scrubbed prepped and draped in the usual sterile manner.  A second preprocedure timeout was performed.  Esmarch exsanguination was utilized and the tourniquet was inflated.  Attention was then focused to the right second digit there was noted to be semirigid plantarflexed very contracture at the  proximal interphalangeal joint.  15 blade was utilized to make a skin incision the extensor tendon was identified and cut transverse just distal to the proximal interphalangeal joint and dissected off of the proximal and middle phalanges.  Collateral ligaments on the proximal interphalangeal joint were released.  A reciprocating sagittal saw was utilized to remove facing sides of the joint until identification of bleeding subchondral bone was made.  A 0.045 inch Yamilka wire was utilized antegrade through the middle phalanx out the distal tip of the toe and then retrograded through the proximal phalanx as fixation for the arthrodesis of the proximal interphalangeal joint.  Next attention was focused to the right third toe where there is noted to be a varus deformity at the proximal interphalangeal joint and a valgus deformity at the distal interphalangeal joint.  A 15 blade was used to make a single skin incision extending over both the proximal interphalangeal joints.  The extensor tendon was identified in transverse incision was made through the tendon over the middle phalanx and this was dissected off of the middle phalanx and over the distal interphalangeal joint and the proximal aspect of the tendon was dissected off of the proximal interphalangeal joint.  A reciprocating sagittal saw was utilized to remove the facing sides of the proximal interphalangeal joint until identification of bleeding subchondral bone.  Next attention was focused to the distal interphalangeal joint and arthroplasty was performed to correct the valgus contracture.  A 0.045 inch Yamilka wire was utilized antegrade through the middle phalanx at the distal tip of the toe and then retrograded through the proximal phalanx as fixation for the arthrodesis of the proximal interphalangeal joint.  Next attention was focused to the right fourth toe there is noted to be a semirigid adductovarus contracture at the proximal interphalangeal joint.   15 blade was utilized to make skin incision, the extensor tendon was utilized and cut transversely over the middle phalanx.  This was dissected off of the middle and proximal phalanx.  A reciprocating sagittal saw was utilized to remove the lateral aspect of the head of the proximal phalanx in order to correct the adductovarus contracture.  A 0.045 inch Yamilka wire was utilized anterograde through the middle phalanx of the distal tip of the toe and then retrograded through the proximal phalanx as fixation for the arthroplasty of the proximal interphalangeal joint.  Next attention was focused to the right fifth toe there is noted to be a flexible adductovarus contracture.  An incision was made just plantar to the distal interphalangeal joint.  A flexor tenotomy as well as a capsulotomy of the plantar aspect of the distal interphalangeal joint was performed.  All the extensor tendons were repaired using 3-0 Monocryl.  Skin was then closed in layers using 3-0 Monocryl and 3-0 nylon.  The Yamilka wires were bent and Jurgan balls were placed over the sharp ends at the distal end of the toes.  Dry sterile dressing was applied.  The tourniquet was deflated there was noted to be brisk capillary refill to all digits.  The patient was extubated and transferred to PACU with vital signs stable.  She will be transferred home today with instructions to be heel touch weightbearing only in a controlled ankle motion boot and to leave dressings clean dry and intact until her follow-up appointment in 1 week.    Electronically Signed by: Marimar Lugo DPM        Dictated Utilizing Dragon Dictation

## 2020-03-17 NOTE — H&P
PODIATRIC SURGERY  History and Physical      Principal problem: Hammertoe of right foot    Subjective .     History of present illness:    Ms. Hina Marcum is a 59 y.o. years old female with past medical history significant for hypothyroid, GERD. Patient presents with chronic pain to right foot toes. She has history of multiple injuries and fractures involving the toes causing deformation, making it painful to wear shoes and ambulate. She presents today for surgical treatment.     History taken from: patient    Case was discussed with patient    Review of Systems    Constitutional: no fever, chills and night sweats. No appetite change or unexpected weight change. No fatigue.  Eyes: no eye drainage, itching or redness.  HEENT: no mouth sores, dysphagia or nose bleed.  Respiratory: no for shortness of breath, cough or production of sputum.  Cardiovascular: no chest pain, no palpitations, no orthopnea.  Gastrointestinal: no nausea, vomiting or diarrhea. No abdominal pain, hematemesis or rectal bleeding.  Genitourinary: no dysuria or polyuria.  Hematologic/lymphatic: no lymph node abnormalities, no easy bruising or easy bleeding.  Musculoskeletal: no muscle or joint pain.  Skin: No rash and no itching.  Neurological: no loss of consciousness, no seizure, no headache.  Behavioral/Psych: no depression or suicidal ideation.  Endocrine: no hot flashes.  Immunologic: negative.    Past Medical History    Past Medical History:   Diagnosis Date   • Arthritis    • Back pain    • Fracture     RIGHT ELBOW   • GERD (gastroesophageal reflux disease)    • Hammer toe of right foot    • Thyroid disease        Past Surgical History    Past Surgical History:   Procedure Laterality Date   • COLONOSCOPY     • FOOT SURGERY     • SEPTOPLASTY         Family History    Family History   Problem Relation Age of Onset   • Heart disease Father    • Heart disease Mother        Social History    Social History     Tobacco Use   • Smoking  "status: Former Smoker     Types: Cigarettes     Last attempt to quit: 1996     Years since quittin.5   • Smokeless tobacco: Never Used   Substance Use Topics   • Alcohol use: Yes     Comment: occasionally    • Drug use: No       Allergies    Patient has no known allergies.    Objective     /98 (BP Location: Left arm, Patient Position: Lying)   Pulse 63   Temp 97.9 °F (36.6 °C) (Oral)   Resp 20   Ht 162.6 cm (64\")   Wt 77.8 kg (171 lb 8 oz)   SpO2 96%   BMI 29.44 kg/m²     Temp:  [97.9 °F (36.6 °C)] 97.9 °F (36.6 °C)      No intake or output data in the 24 hours ending 20 0836      Physical Exam:     General Appearance:    Alert, cooperative, in no acute distress   Head:    Normocephalic, without obvious abnormality, atraumatic    Heart:    Regular rhythm and normal rate     Chest Wall:    No abnormalities observed   Abdomen:    Soft non-tender, non-distended, no guarding, no rebound                tenderness   Extremities:   Moves all extremities well, no edema, no cyanosis, no             Redness. Right foot toes 2,3,4,5 contractures.   Pulses:   Pulses palpable and equal bilaterally   Skin:   No bleeding, bruising or rash   Lymph nodes:   No palpable adenopathy               Results from last 7 days   Lab Units 20  0837   WBC 10*3/mm3 7.95     No results found for: NEUTROABS    Results from last 7 days   Lab Units 20  0837   CREATININE mg/dL 0.74             Imaging Results (Last 24 Hours)     ** No results found for the last 24 hours. **            Results Review:    I have personally reviewed laboratory data, culture results, radiology studies and antimicrobial therapy.    Hospital Medications (active)       Dose Frequency Start End    ceFAZolin Sodium-Dextrose (ANCEF) IVPB (duplex) 2 g 2 g Once 3/17/2020     Admin Instructions: Administer Within 1 Hour of Surgical Incision.<BR>Redose 4 Hours From Pre-Op Dose if Procedure Ongoing or Blood Loss Greater Than 1.5 " L<BR>Caution: Look alike/sound alike drug alert    Route: Intravenous            PROBLEM LIST:    Patient Active Problem List   Diagnosis   • Hammertoe of right foot       Assessment/Plan     ASSESSMENT:    59 year old female with painful right foot toe deformities 2,3,4,5 secondary to fractures many years ago.    PLAN:    -Proceeding with surgical correction of contracted right foot digits 2,3,4,5. All alternatives, risks, benefits were discussed.      Patients findings and recommendations were discussed with patient    Code Status:   There are no questions and answers to display.       Marimar Lugo DPM  03/17/20  08:36

## 2020-03-17 NOTE — ANESTHESIA POSTPROCEDURE EVALUATION
Patient: Hina Marcum    Procedure Summary     Date:  03/17/20 Room / Location:  [facility] OR 01 /  [facility]    Anesthesia Start:  0900 Anesthesia Stop:  1052    Procedure:  HAMMER TOE REPAIR x 4 (Right Toes) Diagnosis:       Hammertoe of right foot      (Hammertoe of right foot [M20.41])    Surgeon:  Marimar Lugo DPM Provider:  Kevin Wan DO    Anesthesia Type:  general ASA Status:  2          Anesthesia Type: general    Vitals  Vitals Value Taken Time   /79 3/17/2020 11:23 AM   Temp 97.3 °F (36.3 °C) 3/17/2020 10:53 AM   Pulse 82 3/17/2020 11:23 AM   Resp 20 3/17/2020 11:23 AM   SpO2 96 % 3/17/2020 11:23 AM           Post Anesthesia Care and Evaluation    Patient location during evaluation: PHASE II  Patient participation: complete - patient participated  Level of consciousness: awake and alert  Pain score: 1  Pain management: adequate  Airway patency: patent  Anesthetic complications: No anesthetic complications  PONV Status: controlled  Cardiovascular status: acceptable  Respiratory status: acceptable and room air  Hydration status: euvolemic  No anesthesia care post op

## 2020-05-19 ENCOUNTER — OFFICE VISIT (OUTPATIENT)
Dept: CARDIOLOGY | Facility: CLINIC | Age: 60
End: 2020-05-19

## 2020-05-19 DIAGNOSIS — R07.89 CHEST DISCOMFORT: Primary | ICD-10-CM

## 2020-05-19 DIAGNOSIS — K21.9 GASTROESOPHAGEAL REFLUX DISEASE WITHOUT ESOPHAGITIS: ICD-10-CM

## 2020-05-19 PROCEDURE — 99213 OFFICE O/P EST LOW 20 MIN: CPT | Performed by: INTERNAL MEDICINE

## 2020-10-01 ENCOUNTER — HOSPITAL ENCOUNTER (OUTPATIENT)
Dept: BONE DENSITY | Facility: HOSPITAL | Age: 60
Discharge: HOME | End: 2020-10-01
Admitting: NURSE PRACTITIONER

## 2020-10-01 DIAGNOSIS — N95.1 POST MENOPAUSAL SYNDROME: ICD-10-CM

## 2020-10-01 PROCEDURE — 77080 DXA BONE DENSITY AXIAL: CPT | Performed by: RADIOLOGY

## 2020-10-01 PROCEDURE — 77080 DXA BONE DENSITY AXIAL: CPT

## 2021-01-27 ENCOUNTER — OFFICE VISIT (OUTPATIENT)
Dept: OBSTETRICS AND GYNECOLOGY | Facility: CLINIC | Age: 61
End: 2021-01-27

## 2021-01-27 VITALS
BODY MASS INDEX: 26.95 KG/M2 | DIASTOLIC BLOOD PRESSURE: 104 MMHG | HEIGHT: 67 IN | WEIGHT: 171.7 LBS | SYSTOLIC BLOOD PRESSURE: 160 MMHG

## 2021-01-27 DIAGNOSIS — R87.612 LOW GRADE SQUAMOUS INTRAEPITHELIAL LESION ON CYTOLOGIC SMEAR OF CERVIX (LGSIL): Primary | ICD-10-CM

## 2021-01-27 PROCEDURE — 57454 BX/CURETT OF CERVIX W/SCOPE: CPT | Performed by: OBSTETRICS & GYNECOLOGY

## 2021-01-27 PROCEDURE — 99203 OFFICE O/P NEW LOW 30 MIN: CPT | Performed by: OBSTETRICS & GYNECOLOGY

## 2021-01-27 RX ORDER — ERYTHROMYCIN 5 MG/G
OINTMENT OPHTHALMIC
COMMUNITY
Start: 2021-01-05

## 2021-01-27 RX ORDER — TRAZODONE HYDROCHLORIDE 50 MG/1
TABLET ORAL
COMMUNITY
Start: 2020-12-17

## 2021-01-27 RX ORDER — DICYCLOMINE HYDROCHLORIDE 10 MG/1
CAPSULE ORAL
COMMUNITY
Start: 2020-12-17

## 2021-01-27 RX ORDER — LEVOTHYROXINE SODIUM 0.1 MG/1
TABLET ORAL
COMMUNITY
Start: 2020-12-17 | End: 2022-10-26

## 2021-01-27 RX ORDER — ESTROGENS, CONJUGATED 0.9 MG
TABLET ORAL
COMMUNITY
Start: 2020-12-21 | End: 2021-07-16

## 2021-01-27 RX ORDER — ROSUVASTATIN CALCIUM 10 MG/1
TABLET, COATED ORAL
COMMUNITY
Start: 2020-12-17

## 2021-01-27 NOTE — PROGRESS NOTES
"Chief Complaint   Patient presents with   • Consult     history of abnormal pap smears   • Colposcopy       Subjective   HPI  Hina Marcum is a 60 y.o. female, , who presents for a consult due to history of abnormal pap smears (persistent).  The denies history of abnormal bleeding. Patient states she had her annual exam with pap smear earlier this month at [facility] in [place], [place]. Patient called their office during visit so that pap smear records could be faxed here.    She states she has experienced this problem for 2 years. The patient reports additional symptoms as bloating.      Her last LMP was No LMP recorded. Patient is postmenopausal. Partner Status: Marital Status: , but with a new partner.  New Partners since last visit: n/a.  Desires STD Screening: no.    Additional OB/GYN History   Current contraception: contraceptive methods: Post menopausal status    Last Pap :   Last Completed Pap Smear       Status Date      PAP SMEAR Patient-Reported (Performed Externally) 2021 approximate date, perfomed in [place] (Ashley Davidson MD at [facility])        History of abnormal Pap smear: yes - persistent for past 2 years  Last mammogram:   Last Completed Mammogram       Status Date      MAMMOGRAM No completions recorded        Tobacco Usage?: No   OB History        4    Para   2    Term   2            AB   2    Living   2       SAB   2    TAB        Ectopic        Molar        Multiple        Live Births   2                Health Maintenance   Topic Date Due   • Annual Gynecologic Pelvic and Breast Exam  1960   • MAMMOGRAM  1959   • COLONOSCOPY  1959   • ANNUAL PHYSICAL  1962   • ZOSTER VACCINE (1 of 2) 2009   • HEPATITIS C SCREENING  2019   • INFLUENZA VACCINE  2020   • LIPID PANEL  2021   • PAP SMEAR  2024   • TDAP/TD VACCINES (2 - Td) 2027   • Pneumococcal Vaccine 0-64  Aged Out " "  • MENINGOCOCCAL VACCINE  Aged Out       The additional following portions of the patient's history were reviewed and updated as appropriate: allergies, current medications, past family history, past medical history, past social history, past surgical history and problem list.    Review of Systems   Constitutional: Negative for activity change, appetite change, unexpected weight gain and unexpected weight loss.   Eyes: Negative for visual disturbance.   Respiratory: Negative for cough and shortness of breath.    Cardiovascular: Negative for chest pain and palpitations.   Gastrointestinal: Negative for abdominal distention, abdominal pain, nausea and vomiting.   Genitourinary: Negative for pelvic pain, vaginal bleeding, vaginal discharge and vaginal pain.   Musculoskeletal: Negative for arthralgias.   Neurological: Negative for light-headedness and headache.   Psychiatric/Behavioral: Negative for behavioral problems.   All other systems reviewed and are negative.      I have reviewed and agree with the HPI, ROS, and historical information as entered above. Naman Bishop MD    Objective   BP (!) 160/104 (BP Location: Right arm, Patient Position: Sitting, Cuff Size: Adult)   Ht 170.2 cm (67\")   Wt 77.9 kg (171 lb 11.2 oz)   Breastfeeding No   BMI 26.89 kg/m²     Physical Exam  Exam conducted with a chaperone present.   Constitutional:       Appearance: Normal appearance. She is normal weight.   HENT:      Head: Normocephalic and atraumatic.   Cardiovascular:      Rate and Rhythm: Normal rate and regular rhythm.   Pulmonary:      Effort: Pulmonary effort is normal.      Breath sounds: Normal breath sounds.   Abdominal:      General: Abdomen is flat. Bowel sounds are normal.   Genitourinary:     General: Normal vulva.      Vagina: Normal.      Cervix: Normal.      Uterus: Normal.       Adnexa: Right adnexa normal and left adnexa normal.      Rectum: Normal.            Comments: See colposcopy  Skin:     " General: Skin is warm and dry.   Neurological:      Mental Status: She is alert and oriented to person, place, and time.   Psychiatric:         Mood and Affect: Mood normal.         Behavior: Behavior normal.         Assessment/Plan     Assessment     Problem List Items Addressed This Visit        Other    Low grade squamous intraepithelial lesion on cytologic smear of cervix (LGSIL) - Primary    Relevant Orders    Tissue Pathology Exam            Plan   1. Colposcopy performed today and biopsy with ECC taken today.   Will repeat pap in 6 months unless moderate/severe cervical dysplasia present.     Naman Bishop MD  2021       Colposcopy Procedure Note  Procedures    Indications: Hina Marcum is a 60 y.o. female, , whose most recent pap smear indicated follow up for a colposcopy.  She understands the need for the procedure and is aware of the complications, including post-colposcopic vaginal bleeding, vaginal leukorrhea or cervicitis.  She is aware she may experience discomfort.  After being presented with the risk, benefits, and alternatives the patient wished to proceed.      Most recent Pap smear showed: low-grade squamous intraepithelial neoplasia (LGSIL - encompassing HPV,mild dysplasia,MANAN I).      Prior cervical/vaginal disease: HPV related changes.    Prior cervical treatment: no treatment.    Procedure Details   The risks and benefits of the procedure and Verbal informed consent obtained.  She was positioned in the dorsal lithotomy position and a speculum was inserted into the vagina and excellent visualization of cervix achieved, cervix swabbed x 3 with acetic acid solution and Lugol's solution. The transformation zone was completely visualized.  A cervical biopsy was obtained.  An endocervical curettage was performed.  This colposcopy was satisfactory. She tolerated the procedure well.     Findings:  The procedure was notable for:  Cervix: visible lesion(s) at 11  o'clock  Vaginal inspection: normal without visible lesions.  Vulvar colposcopy: vulvar colposcopy not performed.    Specimens: cervical biopsy at 11 oclock and ECC    Complications: none.      Plan:  1. Specimens labelled and sent to Pathology.  2. Will base further treatment on Pathology findings.    Naman Bishop MD  01/27/2021

## 2021-01-27 NOTE — PATIENT INSTRUCTIONS
Cervical Dysplasia    Cervical dysplasia is a condition in which a woman's cervix cells have abnormal changes. The cervix is the opening of the uterus (womb). It is located between the vagina and the uterus. Cervical dysplasia may be an early sign of cervical cancer.  If left untreated, this condition may become more severe and may progress to cervical cancer. Early detection, treatment, and follow-up care are very important.  What are the causes?  Cervical dysplasia can be caused by a human papillomavirus (HPV) infection. HPV is the most common sexually transmitted infection (STI). HPV is spread from person to person through sexual contact. This includes oral, vaginal, or anal sex.  What increases the risk?  The following factors may make you more likely to develop this condition:  · Having had a sexually transmitted infection (STI), such as herpes, chlamydia or HPV.  · Becoming sexually active before age 18.  · Having had more than one sexual partner.  · Having a sexual partner who has multiple sexual partners.  · Not using protection, such as a condom, during sex, especially with new sexual partners.  · Having a history of cancer of the vagina or vulva.  · Having a weakened body defense (immune) system.  · Being the daughter of a woman who took diethylstilbestrol (MEL) during pregnancy.  · Having a family history of cervical cancer.  · Smoking.  · Using oral contraceptives, also called birth control pills.  · Having had three or more full-term pregnancies.  What are the signs or symptoms?  There are usually no symptoms of this condition. If you do have symptoms, they may include:  · Abnormal vaginal discharge.  · Bleeding between periods or after sex.  · Bleeding during menopause.  · Pain during sex (dyspareunia).  How is this diagnosed?  A test called a Pap test may be done. During this test, cells are taken from the cervix and then examined under a microscope. A test in which tissue is removed from the cervix  (biopsy) may also be done if the Pap test is abnormal or if the cervix looks abnormal.  How is this treated?  Treatment varies based on the severity of the condition. Treatment may include:  · Cryotherapy. During cryotherapy, the abnormal cells are frozen with a steel-tip instrument.  · Loop electrosurgical excision procedure (LEEP). This procedure removes abnormal tissue from the cervix.  · Surgery to remove abnormal tissue. This is usually done in more advanced cases. Surgical options include:  ? A cone biopsy. This is a procedure in which the cervical canal and a portion of the center of the cervix are removed.  ? Hysterectomy. This is a surgery in which the uterus and cervix are removed.  Follow these instructions at home:  · Take over-the-counter and prescription medicines only as told by your health care provider.  · Do not use tampons, have sex, or douche until your health care provider says it is okay.  · Keep follow-up visits as told by your health care provider. This is important. Women who have been treated for cervical dysplasia should have regular pelvic exams and Pap tests as told by their health care provider.  How is this prevented?  · Practice safe sex to help prevent sexually transmitted infections (STI) that may cause this condition.  · Have regular Pap tests. Talk with your health care provider about how often you need these tests. Pap tests will help identify cell changes that can lead to cancer.  Contact a health care provider if:  · You develop genital warts.  Get help right away if:  · You have a fever.  · You have abnormal vaginal discharge.  · Your menstrual period is heavier than normal.  · You develop bright red bleeding. This may include blood clots.  · You have pain or cramps that get worse, and medicine does not help to relieve your pain.  · You feel light-headed and you are unusually weak.  · You have fainting spells.  · You have pain in the abdomen.  Summary  · Cervical dysplasia is  a condition in which a woman's cervix cells have abnormal changes.  · If left untreated, this condition may become more severe and may progress to cervical cancer.  · Early detection, treatment, and follow-up care are very important in managing this condition.  · Have regular pelvic exams and Pap tests. Talk with your health care provider about how often you need these tests. Pap tests will help identify cell changes that can lead to cancer.  This information is not intended to replace advice given to you by your health care provider. Make sure you discuss any questions you have with your health care provider.  Document Revised: 10/09/2019 Document Reviewed: 12/21/2017  Elsevier Patient Education © 2020 [employer]

## 2021-02-12 DIAGNOSIS — R87.612 LOW GRADE SQUAMOUS INTRAEPITHELIAL LESION ON CYTOLOGIC SMEAR OF CERVIX (LGSIL): ICD-10-CM

## 2021-02-25 DIAGNOSIS — Z23 IMMUNIZATION DUE: ICD-10-CM

## 2021-07-16 ENCOUNTER — OFFICE VISIT (OUTPATIENT)
Dept: OBSTETRICS AND GYNECOLOGY | Facility: CLINIC | Age: 61
End: 2021-07-16

## 2021-07-16 VITALS
DIASTOLIC BLOOD PRESSURE: 88 MMHG | SYSTOLIC BLOOD PRESSURE: 134 MMHG | BODY MASS INDEX: 25.8 KG/M2 | WEIGHT: 164.4 LBS | HEIGHT: 67 IN

## 2021-07-16 DIAGNOSIS — Z87.42 HISTORY OF ABNORMAL CERVICAL PAP SMEAR: Primary | ICD-10-CM

## 2021-07-16 DIAGNOSIS — R87.612 LOW GRADE SQUAMOUS INTRAEPITHELIAL LESION ON CYTOLOGIC SMEAR OF CERVIX (LGSIL): ICD-10-CM

## 2021-07-16 PROCEDURE — 99212 OFFICE O/P EST SF 10 MIN: CPT | Performed by: OBSTETRICS & GYNECOLOGY

## 2021-07-16 NOTE — PATIENT INSTRUCTIONS
Cervical Dysplasia    Cervical dysplasia is a condition in which the cells in a woman's cervix have abnormal changes. The cervix is the opening of the uterus. It is located between the vagina and the uterus. Cervical dysplasia may be an early sign of cervical cancer.  If left untreated, this condition may become more severe and may progress to cervical cancer. Early detection, treatment, and follow-up care are very important.  What are the causes?  Cervical dysplasia is usually caused by a human papillomavirus (HPV) infection. HPV is spread from person to person through sexual contact. This includes oral, vaginal, or anal sex. HPV is the most common sexually transmitted infection (STI).  You are more likely to be exposed to HPV through sexual contact if:  · You have had more than one sexual partner or you have a sexual partner who has multiple sexual partners.  · You do not use protection such as a condom during sex, especially with new sexual partners.  What increases the risk?  The following factors may make you more likely to develop this condition:  · Having a family history of cervical cancer or a personal history of cancer of the vagina or vulva.  · Having had an STI, such as herpes, chlamydia, or gonorrhea.  · Becoming sexually active before age 18.  · Having a weakened disease-fighting system (immunesystem).  · Smoking.  · Being the daughter of a woman who took diethylstilbestrol (MEL), a synthetic estrogen, during pregnancy. Exposure during pregnancy may cause cervical abnormalities in the developing fetus.  What are the signs or symptoms?  There are usually no symptoms of this condition. If you do have symptoms, they may include:  · Abnormal vaginal discharge.  · Bleeding between periods or after sex.  · Bleeding during menopause.  · Pain during sex.  How is this diagnosed?  This condition may be diagnosed with a Pap test. During this test, cells are swabbed from the cervix and looked at under a  microscope.  If the Pap test is abnormal or if the cervix looks abnormal, you may also have a test in which a tissue sample is removed from the cervix and looked at under a microscope(biopsy).  How is this treated?  Treatment varies based on the severity of the condition. Treatment may include:  · Cryotherapy. During this therapy, the abnormal cells are frozen with a steel-tipped instrument.  · Loop electrosurgical excision procedure (LEEP). LEEP removes abnormal tissue from the cervix.  · Surgery to remove abnormal tissue. This is usually done in more severe cases. Options include:  ? A cone biopsy. This treatment removes the cervical canal and part of the center of the cervix.  ? Hysterectomy. This is a surgery in which the uterus and cervix are removed.  Follow these instructions at home:  · Take over-the-counter and prescription medicines only as told by your health care provider.  · Do not use tampons, have sex, or douche until your health care provider says it is safe.  · Keep all follow-up visits as told by your health care provider. This is important. Women who have been treated for cervical dysplasia should have regular pelvic exams and Pap tests as told by their health care provider.  How is this prevented?  · Practice safe sex to help prevent STIs.  · Have regular Pap tests. Talk with your health care provider about how often you need these tests. Pap tests will help identify cell changes that can lead to cancer.  · Ask your health care provider about possible vaccines to protect yourself against HPV.  Contact a health care provider if:  · You develop genital warts. The risk of cervical cancer is higher with certain types of HPV.  · Your menstrual period is heavier than normal or you develop bright red bleeding, which may include blood clots.  · You have abnormal vaginal discharge.  · You have a fever.  Get help right away if:  · You have pain or cramps in the abdomen that get worse, and medicine does not  help to relieve your pain.  · You feel light-headed and are unusually weak or pass out.  Summary  · Cervical dysplasia is a condition in which a woman's cervix cells have abnormal changes.  · If left untreated, this condition may become more severe and may progress to cervical cancer.  · Early detection, treatment, and follow-up care are very important in managing this condition.  · Have regular pelvic exams and Pap tests. Talk with your health care provider about how often you need these tests. Pap tests will help identify cell changes that can lead to cancer.  This information is not intended to replace advice given to you by your health care provider. Make sure you discuss any questions you have with your health care provider.  Document Revised: 01/25/2021 Document Reviewed: 01/25/2021  Elsevier Patient Education © 2021 [employer] Inc[employer]

## 2021-07-16 NOTE — PROGRESS NOTES
Chief Complaint   Patient presents with   • Repeat Pap smear           Hina Marcum is a 60 y.o. year old  presenting to be seen for a PAP in follow-up of a prior abnormal PAP and/or HPV screen.    Her prior colposcopy with biopsy was consistent with low grade cervical lesion.     OTHER THINGS SHE WANTS TO DISCUSS TODAY:  Nothing else     The additional following portions of the patient's history were reviewed and updated as appropriate: allergies, current medications, past family history, past medical history, past social history, past surgical history and problem list.    Review of Systems   Constitutional: Negative for activity change, appetite change, unexpected weight gain and unexpected weight loss.   Eyes: Negative for visual disturbance.   Respiratory: Negative for cough and shortness of breath.    Cardiovascular: Negative for chest pain and palpitations.   Gastrointestinal: Negative for abdominal distention, abdominal pain, nausea and vomiting.   Genitourinary: Negative for decreased urine volume, menstrual problem, pelvic pain, pelvic pressure, urgency, urinary incontinence, vaginal bleeding, vaginal discharge and vaginal pain.   Musculoskeletal: Negative for back pain.   Neurological: Negative for light-headedness and headache.   Psychiatric/Behavioral: Negative for agitation, behavioral problems, decreased concentration and depressed mood.     All other systems reviewed and are negative.     I have reviewed and agree with the HPI, ROS, and historical information as entered above. Naman Bishop MD    Physical Exam  Vitals reviewed. Exam conducted with a chaperone present.   Constitutional:       Appearance: Normal appearance. She is normal weight.   HENT:      Head: Normocephalic and atraumatic.   Abdominal:      General: Abdomen is flat. Bowel sounds are normal.      Palpations: Abdomen is soft.   Genitourinary:     General: Normal vulva.      Vagina: Normal.      Cervix: Normal.       Uterus: Normal.       Adnexa: Right adnexa normal and left adnexa normal.   Skin:     General: Skin is warm and dry.   Neurological:      Mental Status: She is alert and oriented to person, place, and time.   Psychiatric:         Mood and Affect: Mood normal.         Behavior: Behavior normal.         Lab Review   Last PAP was unknown at other providers office  indicated HPV +.   Her evaluation in the past has included a colposcopy on 12/2020.  The results of that was LSIL.    Assessment and Plan    Problem List Items Addressed This Visit        Hematology and Neoplasia    Low grade squamous intraepithelial lesion on cytologic smear of cervix (LGSIL)      Other Visit Diagnoses     History of abnormal cervical Pap smear    -  Primary    Relevant Orders    Pap IG, HPV-hr        Due to MANAN I, repeat pap smear was performed today. Will plan on f/u pap in 6 months unless normal, then at one year. If consistent with high grade lesion, will proceed with more aggressive treatment.     Naman Bishop MD  07/16/2021

## 2021-08-06 ENCOUNTER — TELEPHONE (OUTPATIENT)
Dept: OBSTETRICS AND GYNECOLOGY | Facility: CLINIC | Age: 61
End: 2021-08-06

## 2021-08-06 NOTE — TELEPHONE ENCOUNTER
Pt. Called and wanted to know about her lab results, I stated to pt that I sent her a unable to reach letter. I did relay lab results to patient and she v/u

## 2021-09-27 ENCOUNTER — TELEPHONE (OUTPATIENT)
Dept: OBSTETRICS AND GYNECOLOGY | Facility: CLINIC | Age: 61
End: 2021-09-27

## 2022-09-28 ENCOUNTER — TRANSCRIBE ORDERS (OUTPATIENT)
Dept: ADMINISTRATIVE | Facility: HOSPITAL | Age: 62
End: 2022-09-28

## 2022-09-28 DIAGNOSIS — N95.9 MENOPAUSAL PROBLEM: Primary | ICD-10-CM

## 2022-09-30 ENCOUNTER — HOSPITAL ENCOUNTER (OUTPATIENT)
Dept: BONE DENSITY | Facility: HOSPITAL | Age: 62
Discharge: HOME | End: 2022-09-30
Admitting: NURSE PRACTITIONER

## 2022-09-30 DIAGNOSIS — N95.9 MENOPAUSAL PROBLEM: ICD-10-CM

## 2022-09-30 PROCEDURE — 77080 DXA BONE DENSITY AXIAL: CPT

## 2022-09-30 PROCEDURE — 77080 DXA BONE DENSITY AXIAL: CPT | Performed by: RADIOLOGY

## 2022-10-26 ENCOUNTER — OFFICE VISIT (OUTPATIENT)
Dept: OBSTETRICS AND GYNECOLOGY | Facility: CLINIC | Age: 62
End: 2022-10-26

## 2022-10-26 VITALS
HEIGHT: 67 IN | SYSTOLIC BLOOD PRESSURE: 124 MMHG | BODY MASS INDEX: 23.07 KG/M2 | WEIGHT: 147 LBS | DIASTOLIC BLOOD PRESSURE: 82 MMHG

## 2022-10-26 DIAGNOSIS — Z01.419 ENCOUNTER FOR ANNUAL ROUTINE GYNECOLOGICAL EXAMINATION: Primary | ICD-10-CM

## 2022-10-26 PROCEDURE — 99396 PREV VISIT EST AGE 40-64: CPT | Performed by: OBSTETRICS & GYNECOLOGY

## 2022-10-26 RX ORDER — LORATADINE 10 MG/1
TABLET ORAL
COMMUNITY
Start: 2022-10-19

## 2022-10-26 RX ORDER — FLUOXETINE HYDROCHLORIDE 20 MG/1
CAPSULE ORAL
COMMUNITY
Start: 2022-09-30

## 2022-10-26 RX ORDER — LEVOTHYROXINE SODIUM 88 UG/1
TABLET ORAL
COMMUNITY
Start: 2022-09-22
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
## 2022-10-31 LAB — REF LAB TEST METHOD: NORMAL

## 2024-12-03 ENCOUNTER — TRANSCRIBE ORDERS (OUTPATIENT)
Dept: ADMINISTRATIVE | Facility: HOSPITAL | Age: 64
End: 2024-12-03
Payer: MEDICARE

## 2024-12-03 DIAGNOSIS — Z12.31 SCREENING MAMMOGRAM, ENCOUNTER FOR: Primary | ICD-10-CM

## 2025-07-07 ENCOUNTER — HOSPITAL ENCOUNTER (OUTPATIENT)
Facility: HOSPITAL | Age: 65
Discharge: HOME | End: 2025-07-07
Payer: COMMERCIAL

## 2025-07-07 ENCOUNTER — TRANSCRIBE ORDERS (OUTPATIENT)
Dept: ADMINISTRATIVE | Facility: HOSPITAL | Age: 65
End: 2025-07-07
Payer: COMMERCIAL

## 2025-07-07 DIAGNOSIS — M54.50 LOW BACK PAIN, UNSPECIFIED BACK PAIN LATERALITY, UNSPECIFIED CHRONICITY, UNSPECIFIED WHETHER SCIATICA PRESENT: ICD-10-CM

## 2025-07-07 DIAGNOSIS — M54.50 LOW BACK PAIN, UNSPECIFIED BACK PAIN LATERALITY, UNSPECIFIED CHRONICITY, UNSPECIFIED WHETHER SCIATICA PRESENT: Primary | ICD-10-CM

## 2025-07-07 PROCEDURE — 72220 X-RAY EXAM SACRUM TAILBONE: CPT

## 2025-07-07 PROCEDURE — 72220 X-RAY EXAM SACRUM TAILBONE: CPT | Performed by: RADIOLOGY

## (undated) DEVICE — DRSNG WND GZ CURAD OIL EMULSION 3X3IN STRL

## (undated) DEVICE — HOLDER: Brand: DEROYAL

## (undated) DEVICE — SUT ETHLN 3/0 FS1 663G

## (undated) DEVICE — UNDYED BRAIDED (POLYGLACTIN 910), SYNTHETIC ABSORBABLE SUTURE: Brand: COATED VICRYL

## (undated) DEVICE — UNDERCAST PADDING: Brand: DEROYAL

## (undated) DEVICE — SUT MNCRYL 2/0 SH 27IN UD MCP417H

## (undated) DEVICE — DRP C/ARM W/BAND W/CLIPS 41X74IN

## (undated) DEVICE — APPL CHLORAPREP HI/LITE 26ML ORNG

## (undated) DEVICE — ELECTRD BLD EDGE/INSUL1P SFTY SLV 2.75IN

## (undated) DEVICE — PK EXTREM LOWR 70

## (undated) DEVICE — GLV SURG PREMIERPRO MIC LTX PF SZ7 BRN

## (undated) DEVICE — SUT MNCRYL 3/0 PS2 18IN MCP497G

## (undated) DEVICE — GLV SURG SENSICARE W/ALOE PF LF 6.5 STRL

## (undated) DEVICE — CUFF TOURNI XPRESAIR/ADH LEG STD 24X4IN

## (undated) DEVICE — BALL PIN JURGAN W SERIES .045 YEL